# Patient Record
Sex: MALE | Race: WHITE | NOT HISPANIC OR LATINO
[De-identification: names, ages, dates, MRNs, and addresses within clinical notes are randomized per-mention and may not be internally consistent; named-entity substitution may affect disease eponyms.]

---

## 2020-02-07 ENCOUNTER — APPOINTMENT (OUTPATIENT)
Dept: GASTROENTEROLOGY | Facility: HOSPITAL | Age: 60
End: 2020-02-07

## 2020-02-07 PROBLEM — Z00.00 ENCOUNTER FOR PREVENTIVE HEALTH EXAMINATION: Status: ACTIVE | Noted: 2020-02-07

## 2020-02-13 ENCOUNTER — APPOINTMENT (OUTPATIENT)
Dept: GASTROENTEROLOGY | Facility: HOSPITAL | Age: 60
End: 2020-02-13

## 2020-02-13 ENCOUNTER — RESULT REVIEW (OUTPATIENT)
Age: 60
End: 2020-02-13

## 2020-02-14 ENCOUNTER — APPOINTMENT (OUTPATIENT)
Dept: GASTROENTEROLOGY | Facility: HOSPITAL | Age: 60
End: 2020-02-14

## 2020-02-28 ENCOUNTER — APPOINTMENT (OUTPATIENT)
Dept: GASTROENTEROLOGY | Facility: CLINIC | Age: 60
End: 2020-02-28
Payer: MEDICARE

## 2020-02-28 VITALS
HEIGHT: 63 IN | DIASTOLIC BLOOD PRESSURE: 78 MMHG | WEIGHT: 128 LBS | BODY MASS INDEX: 22.68 KG/M2 | HEART RATE: 81 BPM | OXYGEN SATURATION: 95 % | SYSTOLIC BLOOD PRESSURE: 110 MMHG

## 2020-02-28 DIAGNOSIS — Z86.59 PERSONAL HISTORY OF OTHER MENTAL AND BEHAVIORAL DISORDERS: ICD-10-CM

## 2020-02-28 PROCEDURE — 99214 OFFICE O/P EST MOD 30 MIN: CPT

## 2020-02-28 NOTE — HISTORY OF PRESENT ILLNESS
[de-identified] : Hospitalized for UGI Bleeding earlier this month. EGD 2/7/20 notable for a clean based antral ulcer and a 8 cm HH. Path negative for H. pylori. Returned to hospital 1 week later with bleeding . Repeat EGD notable for same ulcer..now with a visible vessel. The vessel was clipped  x 3. No further bleeding since. Additionally a colonoscopy was performed revealing diverticulosis, hemorrhoids and a adenoma ( removed). Repeat colonoscopy recommended in 5 years

## 2020-02-28 NOTE — PHYSICAL EXAM
[General Appearance - Alert] : alert [Sclera] : the sclera and conjunctiva were normal [Outer Ear] : the ears and nose were normal in appearance [Neck Appearance] : the appearance of the neck was normal [Abdomen Soft] : soft [] : no respiratory distress [No Focal Deficits] : no focal deficits [Skin Color & Pigmentation] : normal skin color and pigmentation [FreeTextEntry1] : deferred

## 2020-02-28 NOTE — ASSESSMENT
[FreeTextEntry1] : 1. History of bleeding  gastric ulcer: Pantoprazole daily for 12 weeks\par \par 2. History of colon polyp:  Colonoscopy in 5 years

## 2020-06-23 ENCOUNTER — APPOINTMENT (OUTPATIENT)
Dept: GASTROENTEROLOGY | Facility: HOSPITAL | Age: 60
End: 2020-06-23

## 2020-06-29 ENCOUNTER — APPOINTMENT (OUTPATIENT)
Dept: GASTROENTEROLOGY | Facility: CLINIC | Age: 60
End: 2020-06-29
Payer: MEDICARE

## 2020-06-29 VITALS
HEART RATE: 83 BPM | WEIGHT: 128 LBS | DIASTOLIC BLOOD PRESSURE: 76 MMHG | SYSTOLIC BLOOD PRESSURE: 119 MMHG | TEMPERATURE: 96.9 F | HEIGHT: 63 IN | OXYGEN SATURATION: 93 % | BODY MASS INDEX: 22.68 KG/M2

## 2020-06-29 PROCEDURE — 99214 OFFICE O/P EST MOD 30 MIN: CPT

## 2020-06-29 RX ORDER — LORAZEPAM 1 MG/1
1 TABLET ORAL
Refills: 0 | Status: ACTIVE | COMMUNITY

## 2020-06-29 NOTE — ASSESSMENT
[FreeTextEntry1] : History of bleeding  gastric antral ulcer:  Continue pantoprazole indefinitely\par \par \par History of colon polyp...colonoscopy in  5 years

## 2020-06-29 NOTE — HISTORY OF PRESENT ILLNESS
[de-identified] : Follow up after hispitalization . Currently without complaints. No nausea, vomiting, melena, hematemesis. Weight stable and increasing. Hospitalized for UGI Bleeding earlier this month. EGD 2/7/20 notable for a clean based antral ulcer and a 8 cm HH. Path negative for H. pylori. Returned to hospital 1 week later with bleeding . Repeat EGD notable for same ulcer..now with a visible vessel. The vessel was clipped  x 3. No further bleeding since. Additionally a colonoscopy was performed revealing diverticulosis, hemorrhoids and a adenoma ( removed). Repeat colonoscopy recommended in 5 years

## 2020-06-29 NOTE — PHYSICAL EXAM
[General Appearance - Alert] : alert [Sclera] : the sclera and conjunctiva were normal [Outer Ear] : the ears and nose were normal in appearance [Neck Appearance] : the appearance of the neck was normal [] : no respiratory distress [Abdomen Soft] : soft [Skin Color & Pigmentation] : normal skin color and pigmentation [No Focal Deficits] : no focal deficits [FreeTextEntry1] : mentally  challenged

## 2021-07-21 ENCOUNTER — APPOINTMENT (OUTPATIENT)
Dept: GASTROENTEROLOGY | Facility: CLINIC | Age: 61
End: 2021-07-21
Payer: MEDICARE

## 2021-07-21 VITALS
SYSTOLIC BLOOD PRESSURE: 100 MMHG | TEMPERATURE: 96.7 F | BODY MASS INDEX: 22.68 KG/M2 | HEART RATE: 91 BPM | HEIGHT: 63 IN | WEIGHT: 128 LBS | DIASTOLIC BLOOD PRESSURE: 80 MMHG | OXYGEN SATURATION: 98 %

## 2021-07-21 DIAGNOSIS — Z86.010 PERSONAL HISTORY OF COLONIC POLYPS: ICD-10-CM

## 2021-07-21 DIAGNOSIS — Z87.19 PERSONAL HISTORY OF OTHER DISEASES OF THE DIGESTIVE SYSTEM: ICD-10-CM

## 2021-07-21 PROCEDURE — 99213 OFFICE O/P EST LOW 20 MIN: CPT

## 2021-07-21 RX ORDER — METRONIDAZOLE 500 MG/1
500 TABLET ORAL
Refills: 0 | Status: DISCONTINUED | COMMUNITY
End: 2021-07-21

## 2021-07-21 RX ORDER — OSELTAMIVIR PHOSPHATE 75 MG/1
75 CAPSULE ORAL
Refills: 0 | Status: DISCONTINUED | COMMUNITY
End: 2021-07-21

## 2021-07-21 RX ORDER — CLARITHROMYCIN 500 MG/1
500 TABLET, FILM COATED ORAL
Refills: 0 | Status: DISCONTINUED | COMMUNITY
End: 2021-07-21

## 2021-07-21 NOTE — HISTORY OF PRESENT ILLNESS
[de-identified] : Presents for follow up. No specific  GI complaints. Hospitalized  2/2020 [facility] UGI bleed.  EGD 2/7/20 notable for a clean based antral ulcer and a 8 cm HH. Path negative for H. pylori. Returned to hospital 1 week later with bleeding . Repeat EGD notable for same ulcer..now with a visible vessel. The vessel was clipped  x 3. No further bleeding since. \par Additionally a colonoscopy was performed revealing diverticulosis, hemorrhoids and a adenoma ( removed). Repeat colonoscopy recommended in 5 years\par \par Patient  is  mentally challenged an unable  to give  additional history

## 2022-01-03 ENCOUNTER — RX RENEWAL (OUTPATIENT)
Age: 62
End: 2022-01-03

## 2022-06-21 ENCOUNTER — RX RENEWAL (OUTPATIENT)
Age: 62
End: 2022-06-21

## 2022-07-06 ENCOUNTER — TRANSCRIPTION ENCOUNTER (OUTPATIENT)
Age: 62
End: 2022-07-06

## 2022-11-08 ENCOUNTER — RX RENEWAL (OUTPATIENT)
Age: 62
End: 2022-11-08

## 2023-04-25 ENCOUNTER — RX RENEWAL (OUTPATIENT)
Age: 63
End: 2023-04-25

## 2023-10-11 ENCOUNTER — RX RENEWAL (OUTPATIENT)
Age: 63
End: 2023-10-11

## 2024-03-26 ENCOUNTER — RX RENEWAL (OUTPATIENT)
Age: 64
End: 2024-03-26

## 2024-03-26 RX ORDER — PANTOPRAZOLE 40 MG/1
40 TABLET, DELAYED RELEASE ORAL
Qty: 30 | Refills: 5 | Status: ACTIVE | COMMUNITY
Start: 2022-01-03 | End: 1900-01-01

## 2024-09-24 ENCOUNTER — RX RENEWAL (OUTPATIENT)
Age: 64
End: 2024-09-24

## 2025-04-02 ENCOUNTER — APPOINTMENT (OUTPATIENT)
Dept: GASTROENTEROLOGY | Facility: CLINIC | Age: 65
End: 2025-04-02
Payer: MEDICAID

## 2025-04-02 VITALS
WEIGHT: 132 LBS | HEART RATE: 90 BPM | OXYGEN SATURATION: 95 % | BODY MASS INDEX: 23.39 KG/M2 | SYSTOLIC BLOOD PRESSURE: 110 MMHG | HEIGHT: 63 IN | DIASTOLIC BLOOD PRESSURE: 70 MMHG

## 2025-04-02 DIAGNOSIS — Z86.0100 PERSONAL HISTORY OF COLON POLYPS, UNSPECIFIED: ICD-10-CM

## 2025-04-02 PROCEDURE — 99204 OFFICE O/P NEW MOD 45 MIN: CPT

## 2025-04-02 RX ORDER — POLYETHYLENE GLYCOL 3350 17 G/17G
17 POWDER, FOR SOLUTION ORAL
Qty: 238 | Refills: 0 | Status: ACTIVE | COMMUNITY
Start: 2025-04-02 | End: 1900-01-01

## 2025-06-04 DIAGNOSIS — Z86.0100 PERSONAL HISTORY OF COLON POLYPS, UNSPECIFIED: ICD-10-CM

## 2025-06-23 ENCOUNTER — APPOINTMENT (OUTPATIENT)
Dept: GASTROENTEROLOGY | Facility: HOSPITAL | Age: 65
End: 2025-06-23